# Patient Record
Sex: FEMALE | Race: WHITE | NOT HISPANIC OR LATINO | Employment: OTHER | URBAN - NONMETROPOLITAN AREA
[De-identification: names, ages, dates, MRNs, and addresses within clinical notes are randomized per-mention and may not be internally consistent; named-entity substitution may affect disease eponyms.]

---

## 2019-09-03 ENCOUNTER — OUTSIDE FACILITY SERVICE (OUTPATIENT)
Dept: CARDIOLOGY | Facility: CLINIC | Age: 49
End: 2019-09-03

## 2019-09-03 ENCOUNTER — TRANSCRIBE ORDERS (OUTPATIENT)
Dept: CARDIOLOGY | Facility: HOSPITAL | Age: 49
End: 2019-09-03

## 2019-09-03 DIAGNOSIS — E78.5 DYSLIPIDEMIA: ICD-10-CM

## 2019-09-03 DIAGNOSIS — I20.0 UNSTABLE ANGINA (HCC): ICD-10-CM

## 2019-09-03 DIAGNOSIS — R07.9 CHEST PAIN, UNSPECIFIED TYPE: Primary | ICD-10-CM

## 2019-09-03 PROCEDURE — 99204 OFFICE O/P NEW MOD 45 MIN: CPT | Performed by: INTERNAL MEDICINE

## 2019-09-03 PROCEDURE — 93010 ELECTROCARDIOGRAM REPORT: CPT | Performed by: INTERNAL MEDICINE

## 2019-09-06 ENCOUNTER — TELEPHONE (OUTPATIENT)
Dept: CARDIOLOGY | Facility: HOSPITAL | Age: 49
End: 2019-09-06

## 2019-09-06 DIAGNOSIS — R07.9 CHEST PAIN, UNSPECIFIED TYPE: Primary | ICD-10-CM

## 2019-09-11 ENCOUNTER — APPOINTMENT (OUTPATIENT)
Dept: CARDIOLOGY | Facility: HOSPITAL | Age: 49
End: 2019-09-11

## 2019-09-23 ENCOUNTER — APPOINTMENT (OUTPATIENT)
Dept: CARDIOLOGY | Facility: HOSPITAL | Age: 49
End: 2019-09-23

## 2019-09-30 ENCOUNTER — HOSPITAL ENCOUNTER (OUTPATIENT)
Dept: CARDIOLOGY | Facility: HOSPITAL | Age: 49
Discharge: HOME | End: 2019-09-30
Admitting: INTERNAL MEDICINE

## 2019-09-30 DIAGNOSIS — R07.9 CHEST PAIN, UNSPECIFIED TYPE: ICD-10-CM

## 2019-09-30 LAB
BH CV ECHO MEAS - ACS: 1.6 CM
BH CV ECHO MEAS - AI DEC SLOPE: 202.4 CM/SEC^2
BH CV ECHO MEAS - AI DEC TIME: 1.5 SEC
BH CV ECHO MEAS - AI MAX PG: 37.1 MMHG
BH CV ECHO MEAS - AI MAX VEL: 304.4 CM/SEC
BH CV ECHO MEAS - AI P1/2T: 440.6 MSEC
BH CV ECHO MEAS - AO MAX PG (FULL): 8.1 MMHG
BH CV ECHO MEAS - AO MAX PG: 11.2 MMHG
BH CV ECHO MEAS - AO MEAN PG (FULL): 3.6 MMHG
BH CV ECHO MEAS - AO MEAN PG: 5.2 MMHG
BH CV ECHO MEAS - AO ROOT AREA: 7 CM^2
BH CV ECHO MEAS - AO ROOT DIAM: 3 CM
BH CV ECHO MEAS - AO V2 MAX: 167.5 CM/SEC
BH CV ECHO MEAS - AO V2 MEAN: 106.4 CM/SEC
BH CV ECHO MEAS - AO V2 VTI: 26.3 CM
BH CV ECHO MEAS - ASC AORTA: 2.9 CM
BH CV ECHO MEAS - AVA(I,A): 1.5 CM^2
BH CV ECHO MEAS - AVA(I,D): 1.5 CM^2
BH CV ECHO MEAS - AVA(V,A): 1.4 CM^2
BH CV ECHO MEAS - AVA(V,D): 1.4 CM^2
BH CV ECHO MEAS - EDV(CUBED): 261.7 ML
BH CV ECHO MEAS - EDV(MOD-SP2): 154.5 ML
BH CV ECHO MEAS - EDV(MOD-SP4): 140.7 ML
BH CV ECHO MEAS - EDV(TEICH): 208.2 ML
BH CV ECHO MEAS - EF(CUBED): 23 %
BH CV ECHO MEAS - EF(MOD-SP2): 30.6 %
BH CV ECHO MEAS - EF(MOD-SP4): 29.3 %
BH CV ECHO MEAS - EF(TEICH): 18.1 %
BH CV ECHO MEAS - ESV(CUBED): 201.4 ML
BH CV ECHO MEAS - ESV(MOD-SP2): 107.2 ML
BH CV ECHO MEAS - ESV(MOD-SP4): 99.5 ML
BH CV ECHO MEAS - ESV(TEICH): 170.6 ML
BH CV ECHO MEAS - FS: 8.4 %
BH CV ECHO MEAS - IVS/LVPW: 1.1
BH CV ECHO MEAS - IVSD: 1.1 CM
BH CV ECHO MEAS - LA DIMENSION: 3.4 CM
BH CV ECHO MEAS - LA/AO: 1.2
BH CV ECHO MEAS - LV MASS(C)D: 273.1 GRAMS
BH CV ECHO MEAS - LV MAX PG: 3.1 MMHG
BH CV ECHO MEAS - LV MEAN PG: 1.6 MMHG
BH CV ECHO MEAS - LV V1 MAX: 88.6 CM/SEC
BH CV ECHO MEAS - LV V1 MEAN: 59.6 CM/SEC
BH CV ECHO MEAS - LV V1 VTI: 14.4 CM
BH CV ECHO MEAS - LVIDD: 6.4 CM
BH CV ECHO MEAS - LVIDS: 5.9 CM
BH CV ECHO MEAS - LVOT AREA: 2.7 CM^2
BH CV ECHO MEAS - LVOT DIAM: 1.9 CM
BH CV ECHO MEAS - LVPWD: 0.93 CM
BH CV ECHO MEAS - MV A MAX VEL: 124.1 CM/SEC
BH CV ECHO MEAS - MV DEC SLOPE: 1438 CM/SEC^2
BH CV ECHO MEAS - MV DEC TIME: 0.06 SEC
BH CV ECHO MEAS - MV E MAX VEL: 88.5 CM/SEC
BH CV ECHO MEAS - MV E/A: 0.71
BH CV ECHO MEAS - MV MAX PG: 5.9 MMHG
BH CV ECHO MEAS - MV MEAN PG: 3.2 MMHG
BH CV ECHO MEAS - MV V2 MAX: 121.1 CM/SEC
BH CV ECHO MEAS - MV V2 MEAN: 84.8 CM/SEC
BH CV ECHO MEAS - MV V2 VTI: 15.1 CM
BH CV ECHO MEAS - MVA(VTI): 2.6 CM^2
BH CV ECHO MEAS - PA ACC TIME: 0.1 SEC
BH CV ECHO MEAS - PA MAX PG (FULL): 1.3 MMHG
BH CV ECHO MEAS - PA MAX PG: 2.5 MMHG
BH CV ECHO MEAS - PA PR(ACCEL): 32.3 MMHG
BH CV ECHO MEAS - PA V2 MAX: 79.2 CM/SEC
BH CV ECHO MEAS - RV MAX PG: 1.2 MMHG
BH CV ECHO MEAS - RV MEAN PG: 0.69 MMHG
BH CV ECHO MEAS - RV V1 MAX: 54.9 CM/SEC
BH CV ECHO MEAS - RV V1 MEAN: 39.8 CM/SEC
BH CV ECHO MEAS - RV V1 VTI: 7.5 CM
BH CV ECHO MEAS - RVDD: 2.2 CM
BH CV ECHO MEAS - SV(AO): 183.6 ML
BH CV ECHO MEAS - SV(CUBED): 60.3 ML
BH CV ECHO MEAS - SV(LVOT): 39.1 ML
BH CV ECHO MEAS - SV(MOD-SP2): 47.2 ML
BH CV ECHO MEAS - SV(MOD-SP4): 41.2 ML
BH CV ECHO MEAS - SV(TEICH): 37.6 ML
LV EF 2D ECHO EST: 25 %
MAXIMAL PREDICTED HEART RATE: 169 BPM
STRESS TARGET HR: 144 BPM

## 2019-09-30 PROCEDURE — 93306 TTE W/DOPPLER COMPLETE: CPT

## 2019-09-30 PROCEDURE — 93306 TTE W/DOPPLER COMPLETE: CPT | Performed by: INTERNAL MEDICINE

## 2019-10-15 ENCOUNTER — OUTSIDE FACILITY SERVICE (OUTPATIENT)
Dept: CARDIOLOGY | Facility: CLINIC | Age: 49
End: 2019-10-15

## 2019-10-15 PROCEDURE — 99214 OFFICE O/P EST MOD 30 MIN: CPT | Performed by: INTERNAL MEDICINE

## 2019-10-22 ENCOUNTER — TELEPHONE (OUTPATIENT)
Dept: CARDIOLOGY | Facility: CLINIC | Age: 49
End: 2019-10-22

## 2019-10-22 DIAGNOSIS — I42.0 CARDIOMYOPATHY, DILATED (HCC): Primary | ICD-10-CM

## 2019-10-22 DIAGNOSIS — I34.0 NONRHEUMATIC MITRAL VALVE REGURGITATION: ICD-10-CM

## 2019-10-22 NOTE — TELEPHONE ENCOUNTER
Pt called asking about scheduling cath/ALAINA. If one is needed can you put the order in? Pt requesting to be scheduled next Wed 10/30/2019

## 2019-10-23 PROBLEM — I42.0 CARDIOMYOPATHY, DILATED: Status: ACTIVE | Noted: 2019-10-23

## 2019-10-23 PROBLEM — I34.0 NONRHEUMATIC MITRAL VALVE REGURGITATION: Status: ACTIVE | Noted: 2019-10-23

## 2019-10-29 ENCOUNTER — ANESTHESIA EVENT (OUTPATIENT)
Dept: CARDIOLOGY | Facility: HOSPITAL | Age: 49
End: 2019-10-29

## 2019-10-29 RX ORDER — SODIUM CHLORIDE 9 MG/ML
9 INJECTION, SOLUTION INTRAVENOUS CONTINUOUS PRN
Status: CANCELLED | OUTPATIENT
Start: 2019-10-29

## 2019-10-29 RX ORDER — SODIUM CHLORIDE 0.9 % (FLUSH) 0.9 %
3-10 SYRINGE (ML) INJECTION AS NEEDED
Status: CANCELLED | OUTPATIENT
Start: 2019-10-29

## 2019-10-29 RX ORDER — SODIUM CHLORIDE 0.9 % (FLUSH) 0.9 %
3 SYRINGE (ML) INJECTION EVERY 12 HOURS SCHEDULED
Status: CANCELLED | OUTPATIENT
Start: 2019-10-29

## 2019-10-29 RX ORDER — LIDOCAINE HYDROCHLORIDE 10 MG/ML
0.5 INJECTION, SOLUTION EPIDURAL; INFILTRATION; INTRACAUDAL; PERINEURAL ONCE AS NEEDED
Status: CANCELLED | OUTPATIENT
Start: 2019-10-29

## 2019-10-30 ENCOUNTER — ANESTHESIA (OUTPATIENT)
Dept: CARDIOLOGY | Facility: HOSPITAL | Age: 49
End: 2019-10-30

## 2019-10-30 ENCOUNTER — HOSPITAL ENCOUNTER (OUTPATIENT)
Facility: HOSPITAL | Age: 49
Setting detail: HOSPITAL OUTPATIENT SURGERY
Discharge: HOME | End: 2019-10-30
Attending: INTERNAL MEDICINE | Admitting: INTERNAL MEDICINE

## 2019-10-30 ENCOUNTER — HOSPITAL ENCOUNTER (OUTPATIENT)
Dept: CARDIOLOGY | Facility: HOSPITAL | Age: 49
Discharge: HOME | End: 2019-10-30

## 2019-10-30 ENCOUNTER — LAB (OUTPATIENT)
Dept: LAB | Facility: HOSPITAL | Age: 49
End: 2019-10-30

## 2019-10-30 VITALS
DIASTOLIC BLOOD PRESSURE: 78 MMHG | SYSTOLIC BLOOD PRESSURE: 128 MMHG | RESPIRATION RATE: 15 BRPM | OXYGEN SATURATION: 100 % | HEART RATE: 85 BPM

## 2019-10-30 VITALS
OXYGEN SATURATION: 96 % | HEIGHT: 64 IN | HEART RATE: 98 BPM | BODY MASS INDEX: 29.24 KG/M2 | DIASTOLIC BLOOD PRESSURE: 80 MMHG | SYSTOLIC BLOOD PRESSURE: 131 MMHG | WEIGHT: 171.3 LBS

## 2019-10-30 DIAGNOSIS — I42.0 CARDIOMYOPATHY, DILATED (HCC): ICD-10-CM

## 2019-10-30 DIAGNOSIS — I34.0 NONRHEUMATIC MITRAL VALVE REGURGITATION: ICD-10-CM

## 2019-10-30 LAB
ANION GAP SERPL CALCULATED.3IONS-SCNC: 10 MMOL/L (ref 5–15)
BUN BLD-MCNC: 11 MG/DL (ref 6–20)
BUN/CREAT SERPL: 16.7 (ref 7–25)
CALCIUM SPEC-SCNC: 9.6 MG/DL (ref 8.6–10.5)
CHLORIDE SERPL-SCNC: 105 MMOL/L (ref 98–107)
CO2 SERPL-SCNC: 28 MMOL/L (ref 22–29)
CREAT BLD-MCNC: 0.66 MG/DL (ref 0.57–1)
DEPRECATED RDW RBC AUTO: 45.9 FL (ref 37–54)
ERYTHROCYTE [DISTWIDTH] IN BLOOD BY AUTOMATED COUNT: 14.2 % (ref 12.3–15.4)
GFR SERPL CREATININE-BSD FRML MDRD: 94 ML/MIN/1.73
GLUCOSE BLD-MCNC: 170 MG/DL (ref 65–99)
HCT VFR BLD AUTO: 38.7 % (ref 34–46.6)
HGB BLD-MCNC: 13.6 G/DL (ref 12–15.9)
INR PPP: 1.05 (ref 0.9–1.1)
MCH RBC QN AUTO: 32.5 PG (ref 26.6–33)
MCHC RBC AUTO-ENTMCNC: 35.2 G/DL (ref 31.5–35.7)
MCV RBC AUTO: 92.4 FL (ref 79–97)
PLATELET # BLD AUTO: 315 10*3/MM3 (ref 140–450)
PMV BLD AUTO: 8 FL (ref 6–12)
POTASSIUM BLD-SCNC: 3.9 MMOL/L (ref 3.5–5.2)
PROTHROMBIN TIME: 10.9 SECONDS (ref 9.6–11.7)
RBC # BLD AUTO: 4.19 10*6/MM3 (ref 3.77–5.28)
SODIUM BLD-SCNC: 143 MMOL/L (ref 136–145)
WBC NRBC COR # BLD: 9.2 10*3/MM3 (ref 3.4–10.8)

## 2019-10-30 PROCEDURE — 85027 COMPLETE CBC AUTOMATED: CPT

## 2019-10-30 PROCEDURE — C1769 GUIDE WIRE: HCPCS | Performed by: INTERNAL MEDICINE

## 2019-10-30 PROCEDURE — 93458 L HRT ARTERY/VENTRICLE ANGIO: CPT | Performed by: INTERNAL MEDICINE

## 2019-10-30 PROCEDURE — 25010000002 PROPOFOL 10 MG/ML EMULSION: Performed by: ANESTHESIOLOGY

## 2019-10-30 PROCEDURE — 80048 BASIC METABOLIC PNL TOTAL CA: CPT

## 2019-10-30 PROCEDURE — 36415 COLL VENOUS BLD VENIPUNCTURE: CPT

## 2019-10-30 PROCEDURE — 25010000002 MIDAZOLAM PER 1 MG: Performed by: INTERNAL MEDICINE

## 2019-10-30 PROCEDURE — C1894 INTRO/SHEATH, NON-LASER: HCPCS | Performed by: INTERNAL MEDICINE

## 2019-10-30 PROCEDURE — 0 IOPAMIDOL PER 1 ML: Performed by: INTERNAL MEDICINE

## 2019-10-30 PROCEDURE — 93320 DOPPLER ECHO COMPLETE: CPT

## 2019-10-30 PROCEDURE — 85610 PROTHROMBIN TIME: CPT

## 2019-10-30 PROCEDURE — 93312 ECHO TRANSESOPHAGEAL: CPT

## 2019-10-30 PROCEDURE — 25010000002 FENTANYL CITRATE (PF) 100 MCG/2ML SOLUTION: Performed by: INTERNAL MEDICINE

## 2019-10-30 PROCEDURE — C1760 CLOSURE DEV, VASC: HCPCS | Performed by: INTERNAL MEDICINE

## 2019-10-30 PROCEDURE — 93325 DOPPLER ECHO COLOR FLOW MAPG: CPT

## 2019-10-30 RX ORDER — ACETAMINOPHEN 325 MG/1
650 TABLET ORAL EVERY 4 HOURS PRN
Status: DISCONTINUED | OUTPATIENT
Start: 2019-10-30 | End: 2019-10-30 | Stop reason: HOSPADM

## 2019-10-30 RX ORDER — CYCLOBENZAPRINE HCL 10 MG
10 TABLET ORAL 3 TIMES DAILY PRN
COMMUNITY

## 2019-10-30 RX ORDER — LIDOCAINE HYDROCHLORIDE 10 MG/ML
0.5 INJECTION, SOLUTION EPIDURAL; INFILTRATION; INTRACAUDAL; PERINEURAL ONCE AS NEEDED
Status: DISCONTINUED | OUTPATIENT
Start: 2019-10-30 | End: 2019-10-30 | Stop reason: HOSPADM

## 2019-10-30 RX ORDER — DIPHENHYDRAMINE HCL 25 MG
25 TABLET ORAL EVERY 6 HOURS PRN
Status: DISCONTINUED | OUTPATIENT
Start: 2019-10-30 | End: 2019-10-30 | Stop reason: HOSPADM

## 2019-10-30 RX ORDER — HYDROXYZINE HYDROCHLORIDE 25 MG/1
25 TABLET, FILM COATED ORAL EVERY 8 HOURS PRN
COMMUNITY

## 2019-10-30 RX ORDER — PROPOFOL 10 MG/ML
VIAL (ML) INTRAVENOUS AS NEEDED
Status: DISCONTINUED | OUTPATIENT
Start: 2019-10-30 | End: 2019-10-30 | Stop reason: SURG

## 2019-10-30 RX ORDER — ALUMINA, MAGNESIA, AND SIMETHICONE 2400; 2400; 240 MG/30ML; MG/30ML; MG/30ML
15 SUSPENSION ORAL EVERY 6 HOURS PRN
Status: DISCONTINUED | OUTPATIENT
Start: 2019-10-30 | End: 2019-10-30 | Stop reason: HOSPADM

## 2019-10-30 RX ORDER — IBUPROFEN 600 MG/1
600 TABLET ORAL EVERY 8 HOURS PRN
COMMUNITY
End: 2022-11-15

## 2019-10-30 RX ORDER — LISINOPRIL 10 MG/1
10 TABLET ORAL DAILY
COMMUNITY
End: 2020-11-17

## 2019-10-30 RX ORDER — OMEPRAZOLE 40 MG/1
40 CAPSULE, DELAYED RELEASE ORAL DAILY
COMMUNITY

## 2019-10-30 RX ORDER — RANITIDINE 150 MG/1
150 TABLET ORAL DAILY
COMMUNITY
End: 2021-11-16

## 2019-10-30 RX ORDER — TRAMADOL HYDROCHLORIDE 50 MG/1
50 TABLET ORAL EVERY 8 HOURS PRN
COMMUNITY

## 2019-10-30 RX ORDER — CARVEDILOL 12.5 MG/1
12.5 TABLET ORAL 2 TIMES DAILY WITH MEALS
COMMUNITY

## 2019-10-30 RX ORDER — SODIUM CHLORIDE 9 MG/ML
INJECTION, SOLUTION INTRAVENOUS CONTINUOUS PRN
Status: DISCONTINUED | OUTPATIENT
Start: 2019-10-30 | End: 2019-10-30 | Stop reason: SURG

## 2019-10-30 RX ORDER — MIDAZOLAM HYDROCHLORIDE 1 MG/ML
INJECTION INTRAMUSCULAR; INTRAVENOUS AS NEEDED
Status: DISCONTINUED | OUTPATIENT
Start: 2019-10-30 | End: 2019-10-30 | Stop reason: HOSPADM

## 2019-10-30 RX ORDER — FUROSEMIDE 40 MG/1
20 TABLET ORAL DAILY
COMMUNITY

## 2019-10-30 RX ORDER — FLUTICASONE PROPIONATE 220 UG/1
1 AEROSOL, METERED RESPIRATORY (INHALATION)
COMMUNITY
End: 2020-11-17

## 2019-10-30 RX ORDER — ONDANSETRON 4 MG/1
4 TABLET, FILM COATED ORAL EVERY 6 HOURS PRN
Status: DISCONTINUED | OUTPATIENT
Start: 2019-10-30 | End: 2019-10-30 | Stop reason: HOSPADM

## 2019-10-30 RX ORDER — DESOXIMETASONE 2.5 MG/G
1 CREAM TOPICAL 2 TIMES DAILY
COMMUNITY

## 2019-10-30 RX ORDER — LIDOCAINE HYDROCHLORIDE 20 MG/ML
INJECTION, SOLUTION INFILTRATION; PERINEURAL AS NEEDED
Status: DISCONTINUED | OUTPATIENT
Start: 2019-10-30 | End: 2019-10-30 | Stop reason: HOSPADM

## 2019-10-30 RX ORDER — MOMETASONE FUROATE 50 UG/1
1 SPRAY, METERED NASAL DAILY
COMMUNITY
End: 2021-11-16

## 2019-10-30 RX ORDER — ALBUTEROL SULFATE 2.5 MG/3ML
2.5 SOLUTION RESPIRATORY (INHALATION) EVERY 4 HOURS PRN
COMMUNITY

## 2019-10-30 RX ORDER — FENTANYL CITRATE 50 UG/ML
INJECTION, SOLUTION INTRAMUSCULAR; INTRAVENOUS AS NEEDED
Status: DISCONTINUED | OUTPATIENT
Start: 2019-10-30 | End: 2019-10-30 | Stop reason: HOSPADM

## 2019-10-30 RX ORDER — SODIUM CHLORIDE 0.9 % (FLUSH) 0.9 %
3 SYRINGE (ML) INJECTION EVERY 12 HOURS SCHEDULED
Status: DISCONTINUED | OUTPATIENT
Start: 2019-10-30 | End: 2019-10-30 | Stop reason: HOSPADM

## 2019-10-30 RX ORDER — LORATADINE 10 MG/1
10 TABLET ORAL DAILY
COMMUNITY

## 2019-10-30 RX ORDER — GABAPENTIN 600 MG/1
600 TABLET ORAL 3 TIMES DAILY
COMMUNITY
End: 2022-11-15

## 2019-10-30 RX ORDER — SPIRONOLACTONE 25 MG/1
25 TABLET ORAL DAILY
COMMUNITY

## 2019-10-30 RX ORDER — DULOXETIN HYDROCHLORIDE 60 MG/1
60 CAPSULE, DELAYED RELEASE ORAL DAILY
COMMUNITY
End: 2020-11-17

## 2019-10-30 RX ORDER — ATORVASTATIN CALCIUM 20 MG/1
20 TABLET, FILM COATED ORAL DAILY
COMMUNITY

## 2019-10-30 RX ORDER — SODIUM CHLORIDE 0.9 % (FLUSH) 0.9 %
3-10 SYRINGE (ML) INJECTION AS NEEDED
Status: DISCONTINUED | OUTPATIENT
Start: 2019-10-30 | End: 2019-10-30 | Stop reason: HOSPADM

## 2019-10-30 RX ORDER — PROMETHAZINE HYDROCHLORIDE 25 MG/1
25 TABLET ORAL EVERY 8 HOURS PRN
COMMUNITY

## 2019-10-30 RX ORDER — ONDANSETRON 2 MG/ML
4 INJECTION INTRAMUSCULAR; INTRAVENOUS EVERY 6 HOURS PRN
Status: DISCONTINUED | OUTPATIENT
Start: 2019-10-30 | End: 2019-10-30 | Stop reason: HOSPADM

## 2019-10-30 RX ORDER — ACYCLOVIR 200 MG/1
200 CAPSULE ORAL
COMMUNITY

## 2019-10-30 RX ORDER — AMITRIPTYLINE HYDROCHLORIDE 10 MG/1
10 TABLET, FILM COATED ORAL NIGHTLY PRN
COMMUNITY
End: 2020-11-17

## 2019-10-30 RX ORDER — SODIUM CHLORIDE 9 MG/ML
9 INJECTION, SOLUTION INTRAVENOUS CONTINUOUS PRN
Status: DISCONTINUED | OUTPATIENT
Start: 2019-10-30 | End: 2019-10-30 | Stop reason: HOSPADM

## 2019-10-30 RX ORDER — BUDESONIDE AND FORMOTEROL FUMARATE DIHYDRATE 160; 4.5 UG/1; UG/1
2 AEROSOL RESPIRATORY (INHALATION)
COMMUNITY

## 2019-10-30 RX ADMIN — SODIUM CHLORIDE: 0.9 INJECTION, SOLUTION INTRAVENOUS at 10:26

## 2019-10-30 RX ADMIN — ACETAMINOPHEN 650 MG: 325 TABLET ORAL at 14:07

## 2019-10-30 RX ADMIN — PROPOFOL 400 MG: 10 INJECTION, EMULSION INTRAVENOUS at 11:13

## 2019-10-30 NOTE — ANESTHESIA PREPROCEDURE EVALUATION
Anesthesia Evaluation     Patient summary reviewed and Nursing notes reviewed   NPO Solid Status: > 8 hours  NPO Liquid Status: > 8 hours           Airway   Mallampati: II  TM distance: >3 FB  Neck ROM: full  No difficulty expected  Dental - normal exam     Pulmonary - normal exam   (+) asthma,  Cardiovascular - normal exam    (+) hypertension, valvular problems/murmurs, CAD, hyperlipidemia,       Neuro/Psych  (+) psychiatric history,     GI/Hepatic/Renal/Endo    (+)  GERD,  diabetes mellitus,     Musculoskeletal     Abdominal  - normal exam    Bowel sounds: normal.   Substance History      OB/GYN          Other                        Anesthesia Plan    ASA 3     MAC     intravenous induction   Anesthetic plan, all risks, benefits, and alternatives have been provided, discussed and informed consent has been obtained with: patient.

## 2019-10-30 NOTE — ANESTHESIA POSTPROCEDURE EVALUATION
Patient: Val Lang    Procedure Summary     Date:  10/30/19 Room / Location:  [facility]    Anesthesia Start:  1113 Anesthesia Stop:  1132    Procedure:  ADULT TRANSESOPHAGEAL ECHO (ALAINA) W/ CONT IF NECESSARY PER PROTOCOL Diagnosis:       Cardiomyopathy, dilated (CMS/HCC)      Nonrheumatic mitral valve regurgitation      (Valvular Disease)      (Mitral Valve Assessment)    Scheduled Providers:  Jon De Luna MD Provider:  Noel Stewart MD    Anesthesia Type:  MAC ASA Status:  3          Anesthesia Type: MAC  Last vitals  BP   128/78 (10/30/19 0927)   Temp       Pulse   85 (10/30/19 0927)   Resp   15 (10/30/19 0927)     SpO2   100 % (10/30/19 0927)     Post Anesthesia Care and Evaluation    Patient location during evaluation: PACU  Patient participation: complete - patient participated  Level of consciousness: awake  Pain scale: See nurse's notes for pain score.  Pain management: adequate  Airway patency: patent  Anesthetic complications: No anesthetic complications  PONV Status: none  Cardiovascular status: acceptable  Respiratory status: acceptable  Hydration status: acceptable    Comments: Patient seen and examined postoperatively; vital signs stable; SpO2 greater than or equal to 90%; cardiopulmonary status stable; nausea/vomiting adequately controlled; pain adequately controlled; no apparent anesthesia complications; patient discharged from anesthesia care when discharge criteria were met

## 2019-10-31 LAB — LV EF 2D ECHO EST: 30 %

## 2019-10-31 PROCEDURE — 93320 DOPPLER ECHO COMPLETE: CPT | Performed by: INTERNAL MEDICINE

## 2019-10-31 PROCEDURE — 93312 ECHO TRANSESOPHAGEAL: CPT | Performed by: INTERNAL MEDICINE

## 2019-10-31 PROCEDURE — 93325 DOPPLER ECHO COLOR FLOW MAPG: CPT | Performed by: INTERNAL MEDICINE

## 2019-11-12 ENCOUNTER — OUTSIDE FACILITY SERVICE (OUTPATIENT)
Dept: CARDIOLOGY | Facility: CLINIC | Age: 49
End: 2019-11-12

## 2019-11-12 PROCEDURE — 99213 OFFICE O/P EST LOW 20 MIN: CPT | Performed by: INTERNAL MEDICINE

## 2019-11-19 ENCOUNTER — OUTSIDE FACILITY SERVICE (OUTPATIENT)
Dept: CARDIOLOGY | Facility: CLINIC | Age: 49
End: 2019-11-19

## 2019-11-19 PROCEDURE — 99213 OFFICE O/P EST LOW 20 MIN: CPT | Performed by: INTERNAL MEDICINE

## 2020-01-13 ENCOUNTER — TELEPHONE (OUTPATIENT)
Dept: CARDIOLOGY | Facility: CLINIC | Age: 50
End: 2020-01-13

## 2020-02-24 DIAGNOSIS — I42.0 CARDIOMYOPATHY, DILATED (HCC): Primary | ICD-10-CM

## 2020-02-25 ENCOUNTER — OUTSIDE FACILITY SERVICE (OUTPATIENT)
Dept: CARDIOLOGY | Facility: CLINIC | Age: 50
End: 2020-02-25

## 2020-02-25 DIAGNOSIS — I42.0 DILATED CARDIOMYOPATHY (HCC): Primary | ICD-10-CM

## 2020-02-25 DIAGNOSIS — I34.0 NONRHEUMATIC MITRAL VALVE REGURGITATION: ICD-10-CM

## 2020-02-25 PROCEDURE — 99213 OFFICE O/P EST LOW 20 MIN: CPT | Performed by: INTERNAL MEDICINE

## 2020-03-18 ENCOUNTER — APPOINTMENT (OUTPATIENT)
Dept: CARDIOLOGY | Facility: HOSPITAL | Age: 50
End: 2020-03-18

## 2020-06-01 RX ORDER — SACUBITRIL AND VALSARTAN 24; 26 MG/1; MG/1
TABLET, FILM COATED ORAL
Qty: 30 TABLET | Refills: 3 | Status: SHIPPED | OUTPATIENT
Start: 2020-06-01 | End: 2021-04-14 | Stop reason: SDUPTHER

## 2020-06-30 ENCOUNTER — TELEPHONE (OUTPATIENT)
Dept: CARDIOLOGY | Facility: CLINIC | Age: 50
End: 2020-06-30

## 2020-10-16 RX ORDER — BUDESONIDE AND FORMOTEROL FUMARATE DIHYDRATE 160; 4.5 UG/1; UG/1
AEROSOL RESPIRATORY (INHALATION)
COMMUNITY
Start: 2019-09-03 | End: 2021-11-16 | Stop reason: SDUPTHER

## 2020-10-20 ENCOUNTER — OUTSIDE FACILITY SERVICE (OUTPATIENT)
Dept: CARDIOLOGY | Facility: CLINIC | Age: 50
End: 2020-10-20

## 2020-10-20 PROCEDURE — 99213 OFFICE O/P EST LOW 20 MIN: CPT | Performed by: INTERNAL MEDICINE

## 2020-12-01 ENCOUNTER — TELEPHONE (OUTPATIENT)
Dept: CARDIOLOGY | Facility: CLINIC | Age: 50
End: 2020-12-01

## 2020-12-01 NOTE — TELEPHONE ENCOUNTER
Pt called and wanted to know the dosing she is supposed to be taking of Entresto.  She stated pharmacy gave her both 24/26mg and 90/96 (she couldn't remember exact mg but thought that what it was). I advsd per OV on 10/20/2020 she should be taking 24/26mg. She verbally understood and stated would call pharmacy and let them know to d/c the other dosing.

## 2021-01-08 ENCOUNTER — TELEPHONE (OUTPATIENT)
Dept: CARDIOLOGY | Facility: CLINIC | Age: 51
End: 2021-01-08

## 2021-01-11 ENCOUNTER — TELEPHONE (OUTPATIENT)
Dept: CARDIOLOGY | Facility: CLINIC | Age: 51
End: 2021-01-11

## 2021-01-11 NOTE — TELEPHONE ENCOUNTER
Val from [facility] called stating that the patient came in today for a BMP and she has a critical blood glucose of 521.   Please advise.

## 2021-01-11 NOTE — TELEPHONE ENCOUNTER
Please send the labs to PCP please call the patient advised her to call PCP immediately for further treatment and evaluation  If she have any symptoms of blurry vision dizziness not feeling well she needs to go to the emergency room

## 2021-03-23 ENCOUNTER — TELEPHONE (OUTPATIENT)
Dept: CARDIOLOGY | Facility: CLINIC | Age: 51
End: 2021-03-23

## 2021-03-24 NOTE — TELEPHONE ENCOUNTER
Patient is cleared for surgery, letter has been signed, and has been faxed back to the doctors office.

## 2021-04-14 RX ORDER — SACUBITRIL AND VALSARTAN 24; 26 MG/1; MG/1
1 TABLET, FILM COATED ORAL 2 TIMES DAILY
Qty: 60 TABLET | Refills: 5 | Status: SHIPPED | OUTPATIENT
Start: 2021-04-14

## 2021-08-24 ENCOUNTER — OUTSIDE FACILITY SERVICE (OUTPATIENT)
Dept: CARDIOLOGY | Facility: CLINIC | Age: 51
End: 2021-08-24

## 2021-08-24 PROCEDURE — 99214 OFFICE O/P EST MOD 30 MIN: CPT | Performed by: INTERNAL MEDICINE

## 2021-09-22 ENCOUNTER — TELEPHONE (OUTPATIENT)
Dept: CARDIOLOGY | Facility: CLINIC | Age: 51
End: 2021-09-22

## 2021-09-22 NOTE — TELEPHONE ENCOUNTER
PATIENT WAS SUPPOSED TO HAVE ECHO AND F/U WITH DR. MEMBRENO, BUT SHE WAS SICK AND NOT ABLE. SHE IS AWARE DR. MEMBRENO IS LEAVING OFFICE. ASKING HOW SHE CAN GET ECHO RESCHEDULED AT [facility]? SHE TRIED TO CALL TO [facility], BUT TOLD SHE NEEDED TO CONTACT US. I DID MAKE APT WITH DR. DO 11/9/21.

## 2021-09-23 ENCOUNTER — TELEPHONE (OUTPATIENT)
Dept: CARDIOLOGY | Facility: CLINIC | Age: 51
End: 2021-09-23

## 2021-09-24 NOTE — TELEPHONE ENCOUNTER
I will reach out to Yocasta Mohr and see if she can help with this because it does look like he did say he wanted an echo done soon.

## 2021-09-30 NOTE — TELEPHONE ENCOUNTER
I never heard back from Yocasta. I resent the order to [facility] with a note asking them to call the patient to reschedule the echo. I called the patient to let her know and she said that when she called last time, they couldn't reschedule it because Dev was no longer practicing. I told her to call them again and speak to someone else because I am under the impression that if the doctor was still practicing when they signed the order, then the order should still stand. She is going to call [facility] again this afternoon or tomorrow and see if they will schedule it.

## 2021-11-09 ENCOUNTER — OUTSIDE FACILITY SERVICE (OUTPATIENT)
Dept: CARDIOLOGY | Facility: CLINIC | Age: 51
End: 2021-11-09

## 2021-11-09 PROCEDURE — 99214 OFFICE O/P EST MOD 30 MIN: CPT | Performed by: INTERNAL MEDICINE

## 2021-11-16 RX ORDER — FLUTICASONE PROPIONATE 220 UG/1
1 AEROSOL, METERED RESPIRATORY (INHALATION)
COMMUNITY

## 2021-11-16 RX ORDER — AMMONIUM LACTATE 120 MG/G
1 CREAM TOPICAL AS NEEDED
COMMUNITY
End: 2022-11-15

## 2022-11-02 RX ORDER — EMPAGLIFLOZIN 10 MG/1
TABLET, FILM COATED ORAL
Qty: 90 TABLET | Refills: 0 | Status: SHIPPED | OUTPATIENT
Start: 2022-11-02

## 2022-11-08 ENCOUNTER — OUTSIDE FACILITY SERVICE (OUTPATIENT)
Dept: CARDIOLOGY | Facility: CLINIC | Age: 52
End: 2022-11-08

## 2022-11-08 PROCEDURE — 99214 OFFICE O/P EST MOD 30 MIN: CPT | Performed by: INTERNAL MEDICINE

## 2022-11-15 RX ORDER — SACUBITRIL AND VALSARTAN 97; 103 MG/1; MG/1
1 TABLET, FILM COATED ORAL 2 TIMES DAILY
Qty: 60 TABLET | Refills: 0 | Status: SHIPPED | OUTPATIENT
Start: 2022-11-15

## 2022-11-15 RX ORDER — GABAPENTIN 800 MG/1
800 TABLET ORAL 3 TIMES DAILY
COMMUNITY
Start: 2022-11-02

## 2022-11-15 RX ORDER — INSULIN GLARGINE 100 [IU]/ML
INJECTION, SOLUTION SUBCUTANEOUS
COMMUNITY
Start: 2022-11-02

## 2022-11-15 RX ORDER — FLURBIPROFEN SODIUM 0.3 MG/ML
SOLUTION/ DROPS OPHTHALMIC
COMMUNITY
Start: 2022-11-07

## 2022-11-15 RX ORDER — PEN NEEDLE, DIABETIC 29 G X1/2"
NEEDLE, DISPOSABLE MISCELLANEOUS DAILY
COMMUNITY
Start: 2022-10-05

## 2022-11-15 RX ORDER — MOMETASONE FUROATE 50 UG/1
1 SPRAY, METERED NASAL DAILY
COMMUNITY
Start: 2022-11-02

## 2022-11-15 RX ORDER — IBUPROFEN 800 MG/1
TABLET ORAL
COMMUNITY
Start: 2022-10-13

## 2023-06-16 ENCOUNTER — TELEPHONE (OUTPATIENT)
Dept: CARDIOLOGY | Facility: CLINIC | Age: 53
End: 2023-06-16
Payer: MEDICARE

## 2023-06-16 NOTE — TELEPHONE ENCOUNTER
Caller: Val Lee    Relationship to patient: Self    Best call back number: [phone]      Type of visit: FOLLOW UP    Requested date: ASAP    If rescheduling, when is the original appointment: NOTHING SCHEDULED    Additional notes PATIENT WOULD LIKE TO SEE DR GARCIA AT THE [place] LOCATION NO AVAILABLE DATES INTO 2024.PATIENT STATES SHE IS HAVING OCCASIONAL CHEST PAINS.

## 2023-07-25 ENCOUNTER — HOSPITAL ENCOUNTER (OUTPATIENT)
Dept: NUCLEAR MEDICINE | Facility: HOSPITAL | Age: 53
Discharge: HOME | End: 2023-07-25
Payer: MEDICARE

## 2023-07-25 ENCOUNTER — HOSPITAL ENCOUNTER (OUTPATIENT)
Dept: CARDIOLOGY | Facility: HOSPITAL | Age: 53
Discharge: HOME | End: 2023-07-25
Payer: MEDICARE

## 2023-07-25 DIAGNOSIS — I42.0 CARDIOMYOPATHY, DILATED: ICD-10-CM

## 2023-07-25 DIAGNOSIS — R07.9 CHEST PAIN, UNSPECIFIED TYPE: ICD-10-CM

## 2023-07-25 DIAGNOSIS — I42.0 CARDIOMYOPATHY, DILATED: Primary | ICD-10-CM

## 2023-07-25 DIAGNOSIS — I34.0 NONRHEUMATIC MITRAL VALVE REGURGITATION: ICD-10-CM

## 2023-07-25 DIAGNOSIS — R94.30 ABNORMAL RESULTS OF CARDIOVASCULAR FUNCTION STUDIES: ICD-10-CM

## 2023-07-25 LAB
BH CV ECHO MEAS - ACS: 1.84 CM
BH CV ECHO MEAS - AO MAX PG: 13.4 MMHG
BH CV ECHO MEAS - AO MEAN PG: 7.1 MMHG
BH CV ECHO MEAS - AO ROOT DIAM: 2.48 CM
BH CV ECHO MEAS - AO V2 MAX: 178.4 CM/SEC
BH CV ECHO MEAS - AO V2 VTI: 33.3 CM
BH CV ECHO MEAS - AVA(I,D): 1.66 CM2
BH CV ECHO MEAS - EDV(CUBED): 157.3 ML
BH CV ECHO MEAS - EDV(MOD-SP4): 146.6 ML
BH CV ECHO MEAS - EF(MOD-BP): 55 %
BH CV ECHO MEAS - EF(MOD-SP4): 55.5 %
BH CV ECHO MEAS - ESV(CUBED): 85.4 ML
BH CV ECHO MEAS - ESV(MOD-SP4): 65.3 ML
BH CV ECHO MEAS - FS: 18.4 %
BH CV ECHO MEAS - IVS/LVPW: 0.97 CM
BH CV ECHO MEAS - IVSD: 1.18 CM
BH CV ECHO MEAS - LV DIASTOLIC VOL/BSA (35-75): 80.1 CM2
BH CV ECHO MEAS - LV MASS(C)D: 264.5 GRAMS
BH CV ECHO MEAS - LV MAX PG: 4 MMHG
BH CV ECHO MEAS - LV MEAN PG: 2.05 MMHG
BH CV ECHO MEAS - LV SYSTOLIC VOL/BSA (12-30): 35.7 CM2
BH CV ECHO MEAS - LV V1 MAX: 99.4 CM/SEC
BH CV ECHO MEAS - LV V1 VTI: 17.8 CM
BH CV ECHO MEAS - LVIDD: 5.4 CM
BH CV ECHO MEAS - LVIDS: 4.4 CM
BH CV ECHO MEAS - LVOT AREA: 3.1 CM2
BH CV ECHO MEAS - LVOT DIAM: 1.99 CM
BH CV ECHO MEAS - LVPWD: 1.22 CM
BH CV ECHO MEAS - MR MAX PG: 48 MMHG
BH CV ECHO MEAS - MR MAX VEL: 346.6 CM/SEC
BH CV ECHO MEAS - MV A MAX VEL: 34.9 CM/SEC
BH CV ECHO MEAS - MV DEC SLOPE: 301.6 CM/SEC2
BH CV ECHO MEAS - MV DEC TIME: 0.18 MSEC
BH CV ECHO MEAS - MV E MAX VEL: 53.9 CM/SEC
BH CV ECHO MEAS - MV E/A: 1.54
BH CV ECHO MEAS - MV MAX PG: 5.7 MMHG
BH CV ECHO MEAS - MV MEAN PG: 2.45 MMHG
BH CV ECHO MEAS - MV V2 VTI: 31.9 CM
BH CV ECHO MEAS - MVA(VTI): 1.73 CM2
BH CV ECHO MEAS - PA ACC TIME: 0.11 SEC
BH CV ECHO MEAS - RAP SYSTOLE: 3 MMHG
BH CV ECHO MEAS - RV MAX PG: 3.6 MMHG
BH CV ECHO MEAS - RV V1 MAX: 94.8 CM/SEC
BH CV ECHO MEAS - RV V1 VTI: 16.1 CM
BH CV ECHO MEAS - RVDD: 3.7 CM
BH CV ECHO MEAS - SI(MOD-SP4): 44.4 ML/M2
BH CV ECHO MEAS - SV(LVOT): 55.2 ML
BH CV ECHO MEAS - SV(MOD-SP4): 81.3 ML
BH CV REST NUCLEAR ISOTOPE DOSE: 11 MCI
BH CV STRESS BP STAGE 1: NORMAL
BH CV STRESS BP STAGE 2: NORMAL
BH CV STRESS COMMENTS STAGE 1: NORMAL
BH CV STRESS COMMENTS STAGE 2: NORMAL
BH CV STRESS DOSE REGADENOSON STAGE 1: 0.4
BH CV STRESS DURATION MIN STAGE 1: 0
BH CV STRESS DURATION MIN STAGE 2: 4
BH CV STRESS DURATION SEC STAGE 1: 10
BH CV STRESS DURATION SEC STAGE 2: 0
BH CV STRESS HR STAGE 1: 89
BH CV STRESS HR STAGE 2: 94
BH CV STRESS NUCLEAR ISOTOPE DOSE: 32 MCI
BH CV STRESS PROTOCOL 1: NORMAL
BH CV STRESS RECOVERY BP: NORMAL MMHG
BH CV STRESS RECOVERY HR: 98 BPM
BH CV STRESS STAGE 1: 1
BH CV STRESS STAGE 2: 2
LV EF NUC BP: 35 %
MAXIMAL PREDICTED HEART RATE: 165 BPM
PERCENT MAX PREDICTED HR: 61.82 %
STRESS BASELINE BP: NORMAL MMHG
STRESS BASELINE HR: 89 BPM
STRESS PERCENT HR: 73 %
STRESS POST PEAK BP: NORMAL MMHG
STRESS POST PEAK HR: 102 BPM
STRESS TARGET HR: 140 BPM

## 2023-07-25 PROCEDURE — 93017 CV STRESS TEST TRACING ONLY: CPT

## 2023-07-25 PROCEDURE — 0 TECHNETIUM TETROFOSMIN KIT: Performed by: INTERNAL MEDICINE

## 2023-07-25 PROCEDURE — 93018 CV STRESS TEST I&R ONLY: CPT | Performed by: INTERNAL MEDICINE

## 2023-07-25 PROCEDURE — 93016 CV STRESS TEST SUPVJ ONLY: CPT | Performed by: NURSE PRACTITIONER

## 2023-07-25 PROCEDURE — A9502 TC99M TETROFOSMIN: HCPCS | Performed by: INTERNAL MEDICINE

## 2023-07-25 PROCEDURE — 78452 HT MUSCLE IMAGE SPECT MULT: CPT | Performed by: INTERNAL MEDICINE

## 2023-07-25 PROCEDURE — 25010000002 REGADENOSON 0.4 MG/5ML SOLUTION: Performed by: INTERNAL MEDICINE

## 2023-07-25 PROCEDURE — 93306 TTE W/DOPPLER COMPLETE: CPT | Performed by: INTERNAL MEDICINE

## 2023-07-25 PROCEDURE — 93306 TTE W/DOPPLER COMPLETE: CPT

## 2023-07-25 PROCEDURE — 78452 HT MUSCLE IMAGE SPECT MULT: CPT

## 2023-07-25 RX ORDER — REGADENOSON 0.08 MG/ML
0.4 INJECTION, SOLUTION INTRAVENOUS
Status: COMPLETED | OUTPATIENT
Start: 2023-07-25 | End: 2023-07-25

## 2023-07-25 RX ADMIN — REGADENOSON 0.4 MG: 0.08 INJECTION, SOLUTION INTRAVENOUS at 12:07

## 2023-07-25 RX ADMIN — TETROFOSMIN 1 DOSE: 1.38 INJECTION, POWDER, LYOPHILIZED, FOR SOLUTION INTRAVENOUS at 12:07

## 2023-07-25 RX ADMIN — TETROFOSMIN 1 DOSE: 1.38 INJECTION, POWDER, LYOPHILIZED, FOR SOLUTION INTRAVENOUS at 10:54

## 2023-07-27 PROBLEM — R07.9 CHEST PAIN: Status: ACTIVE | Noted: 2023-07-27

## 2023-07-27 PROBLEM — R94.30 ABNORMAL RESULTS OF CARDIOVASCULAR FUNCTION STUDIES: Status: ACTIVE | Noted: 2023-07-27

## 2023-08-04 ENCOUNTER — HOSPITAL ENCOUNTER (OUTPATIENT)
Dept: GENERAL RADIOLOGY | Facility: HOSPITAL | Age: 53
Discharge: HOME | End: 2023-08-04
Payer: MEDICARE

## 2023-08-04 ENCOUNTER — HOSPITAL ENCOUNTER (OUTPATIENT)
Facility: HOSPITAL | Age: 53
Setting detail: HOSPITAL OUTPATIENT SURGERY
Discharge: HOME | End: 2023-08-04
Attending: INTERNAL MEDICINE | Admitting: INTERNAL MEDICINE
Payer: MEDICARE

## 2023-08-04 VITALS
SYSTOLIC BLOOD PRESSURE: 134 MMHG | RESPIRATION RATE: 15 BRPM | DIASTOLIC BLOOD PRESSURE: 81 MMHG | BODY MASS INDEX: 31.5 KG/M2 | HEIGHT: 64 IN | WEIGHT: 184.53 LBS | HEART RATE: 78 BPM | OXYGEN SATURATION: 96 % | TEMPERATURE: 98.1 F

## 2023-08-04 DIAGNOSIS — I42.0 CARDIOMYOPATHY, DILATED: ICD-10-CM

## 2023-08-04 DIAGNOSIS — R94.30 ABNORMAL RESULTS OF CARDIOVASCULAR FUNCTION STUDIES: ICD-10-CM

## 2023-08-04 DIAGNOSIS — R07.9 CHEST PAIN, UNSPECIFIED TYPE: ICD-10-CM

## 2023-08-04 LAB
GLUCOSE BLDC GLUCOMTR-MCNC: 155 MG/DL (ref 70–105)
GLUCOSE BLDC GLUCOMTR-MCNC: 223 MG/DL (ref 70–105)

## 2023-08-04 PROCEDURE — 25510000001 IOPAMIDOL PER 1 ML: Performed by: INTERNAL MEDICINE

## 2023-08-04 PROCEDURE — 99153 MOD SED SAME PHYS/QHP EA: CPT | Performed by: INTERNAL MEDICINE

## 2023-08-04 PROCEDURE — S0260 H&P FOR SURGERY: HCPCS | Performed by: INTERNAL MEDICINE

## 2023-08-04 PROCEDURE — 25010000002 MIDAZOLAM PER 1 MG: Performed by: INTERNAL MEDICINE

## 2023-08-04 PROCEDURE — C1894 INTRO/SHEATH, NON-LASER: HCPCS | Performed by: INTERNAL MEDICINE

## 2023-08-04 PROCEDURE — 25010000002 FENTANYL CITRATE (PF) 100 MCG/2ML SOLUTION: Performed by: INTERNAL MEDICINE

## 2023-08-04 PROCEDURE — 93458 L HRT ARTERY/VENTRICLE ANGIO: CPT | Performed by: INTERNAL MEDICINE

## 2023-08-04 PROCEDURE — 71046 X-RAY EXAM CHEST 2 VIEWS: CPT

## 2023-08-04 PROCEDURE — C1769 GUIDE WIRE: HCPCS | Performed by: INTERNAL MEDICINE

## 2023-08-04 PROCEDURE — 99152 MOD SED SAME PHYS/QHP 5/>YRS: CPT | Performed by: INTERNAL MEDICINE

## 2023-08-04 PROCEDURE — 82948 REAGENT STRIP/BLOOD GLUCOSE: CPT

## 2023-08-04 RX ORDER — ONDANSETRON 4 MG/1
4 TABLET, ORALLY DISINTEGRATING ORAL EVERY 8 HOURS PRN
COMMUNITY

## 2023-08-04 RX ORDER — SODIUM CHLORIDE 9 MG/ML
250 INJECTION, SOLUTION INTRAVENOUS ONCE AS NEEDED
Status: DISCONTINUED | OUTPATIENT
Start: 2023-08-04 | End: 2023-08-04 | Stop reason: HOSPADM

## 2023-08-04 RX ORDER — ONDANSETRON 4 MG/1
4 TABLET, FILM COATED ORAL EVERY 6 HOURS PRN
Status: DISCONTINUED | OUTPATIENT
Start: 2023-08-04 | End: 2023-08-04 | Stop reason: HOSPADM

## 2023-08-04 RX ORDER — LIDOCAINE HYDROCHLORIDE 20 MG/ML
INJECTION, SOLUTION INFILTRATION; PERINEURAL
Status: DISCONTINUED | OUTPATIENT
Start: 2023-08-04 | End: 2023-08-04 | Stop reason: HOSPADM

## 2023-08-04 RX ORDER — FENTANYL CITRATE 50 UG/ML
INJECTION, SOLUTION INTRAMUSCULAR; INTRAVENOUS
Status: DISCONTINUED | OUTPATIENT
Start: 2023-08-04 | End: 2023-08-04 | Stop reason: HOSPADM

## 2023-08-04 RX ORDER — SODIUM CHLORIDE 9 MG/ML
30 INJECTION, SOLUTION INTRAVENOUS CONTINUOUS
Status: DISCONTINUED | OUTPATIENT
Start: 2023-08-04 | End: 2023-08-04 | Stop reason: HOSPADM

## 2023-08-04 RX ORDER — ONDANSETRON 2 MG/ML
4 INJECTION INTRAMUSCULAR; INTRAVENOUS EVERY 6 HOURS PRN
Status: DISCONTINUED | OUTPATIENT
Start: 2023-08-04 | End: 2023-08-04 | Stop reason: HOSPADM

## 2023-08-04 RX ORDER — MIDAZOLAM HYDROCHLORIDE 1 MG/ML
INJECTION INTRAMUSCULAR; INTRAVENOUS
Status: DISCONTINUED | OUTPATIENT
Start: 2023-08-04 | End: 2023-08-04 | Stop reason: HOSPADM

## 2023-08-04 RX ORDER — NITROGLYCERIN 0.4 MG/1
0.4 TABLET SUBLINGUAL
Status: DISCONTINUED | OUTPATIENT
Start: 2023-08-04 | End: 2023-08-04 | Stop reason: HOSPADM

## 2023-08-04 RX ORDER — ACETAMINOPHEN 325 MG/1
650 TABLET ORAL EVERY 4 HOURS PRN
Status: DISCONTINUED | OUTPATIENT
Start: 2023-08-04 | End: 2023-08-04 | Stop reason: HOSPADM

## 2023-08-04 RX ADMIN — SODIUM CHLORIDE 30 ML/HR: 9 INJECTION, SOLUTION INTRAVENOUS at 08:26

## 2023-08-04 NOTE — DISCHARGE INSTRUCTIONS
Post Cath Instructions      Drink plenty of fluids for the next 24 hours.  This helps to eliminate the dye used in your procedure through urination.  You may resume a normal diet; however, try to avoid foods that would cause gas or constipation.    Sedative medication given to you during your catheterization may decrease your judgement and reaction time for up to 24-48 hours.  Therefore:  DO NOT drive or operate hazardous machinery (48 hours)  DO NOT consume alcoholic beverages  DO NOT make any important/legal decisions  Have someone stay with you for at least 24 hours    To allow proper healing and prevent bleeding, the following activities are to be strictly avoided for the next 24-48 hours:  Excessive bending at wound site  Straining (anything that would tense up muscles around the affected puncture site)  Lifting objects greater than 5 pounds, pushing, or pulling for 5 days  For Arm Cases:  No flexing at the puncture site, such as hammering, golfing, bowling, or swinging any objects  For Groin Cases:  Refrain from sexual activity  Refrain from running or vigorous walking  No prolonged sitting or standing  Limit stair climbing as much as possible    Keep the puncture site clean and dry.  You may remove the dressing tomorrow and replace it with a band-aid for at least one additional day.  Gently clean the site with mild soap and water.  No scrubbing/rubbing and lightly pat the area dry.  Showers are acceptable; however, avoid submerging in water (tub baths, hot tubs, swimming pools, dishwater, etc.) for at least one week.  The site should be completely healed before resuming these activities to reduce the risk of infection.  Check the site often.  Watch for signs and symptoms of infection and notify your physician if any of the following occur:  Bleeding or an increase in swelling at the puncture site  Fever  Increased soreness around puncture site  Foul odor or significant drainage from the puncture  "site  Swelling, redness, or warmth at the puncture site    **A bruise or small "pea sized" lump under the skin at the puncture site is not unusual.  This should disappear within 3-4 weeks.**  CONTACT YOUR PHYSICIAN OR CALL 911 IF YOU EXPERIENCE ANY OF THE FOLLOWING:  Increased angina (chest pain) or frequent sensations of pressure, burning, pain, or other discomfort in the chest, arm, jaws, or stomach  Lightheadedness, dizziness, faint feeling, sweating, or difficulty breathing  Odd sensation changes like numbness, tingling, coldness, or pain in the arm or leg in which the catheter was inserted  Limb in which the catheter was inserted becomes pale/bluish in color    IMPORTANT:  Although this occurs very rarely, if you should develop bright red or excessive bleeding, feel a "pop" inside at the insertion site, or notice a sudden increase in swelling larger than a walnut, you should call 911.  Hold continuous firm pressure to the access site until emergency personnel arrive.  It is best if someone else can do this for you.    "

## 2023-08-04 NOTE — Clinical Note
A 6 fr sheath was  inserted using micropuncture technique with ultrasound guidance into the left femoral artery.

## 2023-08-04 NOTE — H&P
[facility] CARDIOLOGY    Jose, Ann-Marie IBRAHIM, RAMON    CHIEF COMPLAINT:     Shortness of breath  Cardiomyopathy    HISTORY OF PRESENT ILLNESS:    49-year-old female with past medical history that is significant history of nonischemic cardiomyopathy history of hypertension hyperlipidemia history of diabetes prior cardiac catheterization 2019 with nonobstructive coronary artery disease with cardiomyopathy and low LV ejection fraction      Now complaining of symptoms of chest pain patient continues to have symptoms of chest discomfort in spite of medical therapy patient had abnormal stress test with low LV ejection fraction plan at this time is to proceed with cardiac catheterization for definite diagnosis and treatment options      Past Medical History:   Diagnosis Date    Acid reflux     Asthma     Coronary artery disease     Depression     Diabetes mellitus     Herpes genitalia     Hyperlipidemia     Hypertension      Past Surgical History:   Procedure Laterality Date    CARDIAC CATHETERIZATION N/A 10/30/2019    Procedure: Left Heart Cath;  Surgeon: Jon De Luna MD;  Location:  [facility];  Service: Cardiovascular    CARDIAC CATHETERIZATION N/A 10/30/2019    Procedure: Coronary angiography;  Surgeon: Jon De Luna MD;  Location:  [facility];  Service: Cardiovascular     No family history on file.  Social History     Tobacco Use    Smoking status: Every Day     Medications Prior to Admission   Medication Sig Dispense Refill Last Dose    acyclovir (ZOVIRAX) 200 MG capsule Take 200 mg by mouth 5 (Five) Times a Day.       acyclovir (ZOVIRAX) 400 MG tablet Take 1 tablet by mouth Every 12 (Twelve) Hours.       albuterol (PROVENTIL) (2.5 MG/3ML) 0.083% nebulizer solution Take 2.5 mg by nebulization Every 4 (Four) Hours As Needed for Wheezing.       Albuterol Sulfate (VENTOLIN HFA IN) Inhale 2 puffs 4 (Four) Times a Day As Needed.       aspirin  81 MG EC tablet Take 81 mg by mouth Daily.       atorvastatin (LIPITOR) 20 MG tablet Take 20 mg by mouth Daily.       B-D UF III MINI PEN NEEDLES 31G X 5 MM misc        budesonide-formoterol (SYMBICORT) 160-4.5 MCG/ACT inhaler Inhale 2 puffs 2 (Two) Times a Day.       carvedilol (COREG) 12.5 MG tablet Take 12.5 mg by mouth 2 (Two) Times a Day With Meals.       Cholecalciferol (Vitamin D3) 50 MCG (2000 UT) tablet Take 1 tablet by mouth Daily.       cyclobenzaprine (FLEXERIL) 10 MG tablet Take 10 mg by mouth 3 (Three) Times a Day As Needed for Muscle Spasms.       desoximetasone (TOPICORT) 0.25 % cream Apply 1 application topically to the appropriate area as directed 2 (Two) Times a Day.       fluticasone (FLOVENT HFA) 220 MCG/ACT inhaler Inhale 1 puff 2 (Two) Times a Day.       furosemide (LASIX) 40 MG tablet Take 20 mg by mouth Daily.       gabapentin (NEURONTIN) 800 MG tablet Take 1 tablet by mouth 3 (Three) Times a Day.       hydrOXYzine (ATARAX) 25 MG tablet Take 25 mg by mouth Every 8 (Eight) Hours As Needed for Itching.       ibuprofen (ADVIL,MOTRIN) 800 MG tablet PLEASE SEE ATTACHED FOR DETAILED DIRECTIONS       Jardiance 10 MG tablet tablet TAKE 1 TABLET BY MOUTH EVERY DAY 90 tablet 0     Lantus SoloStar 100 UNIT/ML injection pen INJECT 62 UNIT UNDER SKIN AT BEDTIME       Liraglutide (VICTOZA) 18 MG/3ML solution pen-injector injection Inject 1.8 mg under the skin into the appropriate area as directed Daily.       loratadine (CLARITIN) 10 MG tablet Take 10 mg by mouth Daily.       metFORMIN (GLUCOPHAGE) 500 MG tablet Take 1,000 mg by mouth 2 (Two) Times a Day With Meals.       metFORMIN ER (GLUCOPHAGE-XR) 500 MG 24 hr tablet TAKE 4 TABS BY MOUTH EVERY DAY       mometasone (NASONEX) 50 MCG/ACT nasal spray 1 spray Daily.       nystatin (MYCOSTATIN) 100,000 unit/mL suspension TAKE 1 TEASPOONFUL (5 ML) BY MOUTH FOUR TIMES A DAY SWISH AND SWALLOW       omeprazole (priLOSEC) 40 MG capsule Take 40 mg by mouth Daily.        promethazine (PHENERGAN) 25 MG tablet Take 25 mg by mouth Every 8 (Eight) Hours As Needed for Nausea or Vomiting.       sacubitril-valsartan (Entresto) 24-26 MG tablet Take 1 tablet by mouth 2 (Two) Times a Day. 60 tablet 5     sacubitril-valsartan (Entresto)  MG tablet Take 1 tablet by mouth 2 (Two) Times a Day. 60 tablet 0     sertraline (ZOLOFT) 50 MG tablet Take 50 mg by mouth Daily.       spironolactone (ALDACTONE) 25 MG tablet Take 25 mg by mouth Daily. Take 1/2 to 1 tablet daily       traMADol (ULTRAM) 50 MG tablet Take 50 mg by mouth Every 8 (Eight) Hours As Needed for Moderate Pain .       UltiCare Mini Pen Needles 31G X 6 MM misc Daily.        Allergies:  Latex, natural rubber      There is no immunization history on file for this patient.        REVIEW OF SYSTEMS:   Review of Systems   Constitutional: Negative for chills, decreased appetite and malaise/fatigue.   HENT:  Negative for congestion and nosebleeds.    Eyes:  Negative for blurred vision and double vision.   Cardiovascular:  Positive for chest pain and dyspnea on exertion.   Respiratory:  Positive for shortness of breath. Negative for cough.    Hematologic/Lymphatic: Negative for adenopathy. Does not bruise/bleed easily.   Skin:  Negative for color change and rash.   Musculoskeletal:  Negative for back pain and joint pain.   Gastrointestinal:  Negative for bloating, abdominal pain, hematemesis and hematochezia.   Genitourinary:  Negative for dysuria and hematuria.   Neurological:  Negative for dizziness and focal weakness.   Psychiatric/Behavioral:  Negative for altered mental status. The patient does not have insomnia.      Medication Review:  Scheduled Meds:  Continuous Infusions:No current facility-administered medications for this encounter.    PRN Meds:.    Vital Signs  There were no vitals taken for this visit.         Physical Exam:  Constitutional:       Appearance: Well-developed.   Eyes:      Conjunctiva/sclera: Conjunctivae  normal.      Pupils: Pupils are equal, round, and reactive to light.   HENT:      Head: Normocephalic and atraumatic.   Neck:      Thyroid: No thyromegaly.   Pulmonary:      Effort: Pulmonary effort is normal.      Breath sounds: Normal breath sounds.   Cardiovascular:      Abnormal PMI. Normal rate. Regular rhythm.   Pulses:     Intact distal pulses.   Edema:     Peripheral edema absent.   Abdominal:      General: Bowel sounds are normal.      Palpations: Abdomen is soft.   Musculoskeletal:      Cervical back: Normal range of motion and neck supple. Skin:     General: Skin is warm.   Neurological:      Mental Status: Alert and oriented to person, place, and time.       Results Review:    I reviewed the patient's new clinical results.  Lab Results (most recent)       None            Imaging Results (Most Recent)       None          reviewed    ECG/EMG Results (most recent)       None          reviewed      Assessment & Plan     Patient Active Problem List   Diagnosis    Cardiomyopathy, dilated    Nonrheumatic mitral valve regurgitation    Chest pain    Abnormal results of cardiovascular function studies           Mannie Hatch MD  08/04/23  08:02 EDT

## 2023-08-04 NOTE — Clinical Note
Prepped: left groin. Prepped with: ChloraPrep. The site was clipped. The patient was draped in a sterile fashion.

## 2023-08-04 NOTE — DISCHARGE INSTR - LAB
Post Cath Instructions         Call Dr. Hatch's office to schedule a follow up appointment in 2 weeks at [phone].      Drink plenty of fluids for the next 24 hours.  This helps to eliminate the dye used in your procedure through urination.  You may resume a normal diet; however, try to avoid foods that would cause gas or constipation.    Sedative medication given to you during your catheterization may decrease your judgement and reaction time for up to 24-48 hours.  Therefore:  DO NOT drive or operate hazardous machinery (48 hours)  DO NOT consume alcoholic beverages  DO NOT make any important/legal decisions  Have someone stay with you for at least 24 hours    To allow proper healing and prevent bleeding, the following activities are to be strictly avoided for the next 24-48 hours:  Excessive bending at wound site  Straining (anything that would tense up muscles around the affected puncture site)  Lifting objects greater than 5 pounds, pushing, or pulling for 5 days      For Groin Cases:  Refrain from sexual activity  Refrain from running or vigorous walking  No prolonged sitting or standing  Limit stair climbing as much as possible    Keep the puncture site clean and dry.  You may remove the dressing tomorrow and replace it with a band-aid for at least one additional day.  Gently clean the site with mild soap and water.  No scrubbing/rubbing and lightly pat the area dry.  Showers are acceptable; however, avoid submerging in water (tub baths, hot tubs, swimming pools, dishwater, etc.) for at least one week.  The site should be completely healed before resuming these activities to reduce the risk of infection.  Check the site often.  Watch for signs and symptoms of infection and notify your physician if any of the following occur:  Bleeding or an increase in swelling at the puncture site  Fever  Increased soreness around puncture site  Foul odor or significant drainage from the puncture site  Swelling,  "redness, or warmth at the puncture site    **A bruise or small "pea sized" lump under the skin at the puncture site is not unusual.  This should disappear within 3-4 weeks.**  CONTACT YOUR PHYSICIAN OR CALL 911 IF YOU EXPERIENCE ANY OF THE FOLLOWING:  Increased angina (chest pain) or frequent sensations of pressure, burning, pain, or other discomfort in the chest, arm, jaws, or stomach  Lightheadedness, dizziness, faint feeling, sweating, or difficulty breathing  Odd sensation changes like numbness, tingling, coldness, or pain in the arm or leg in which the catheter was inserted  Limb in which the catheter was inserted becomes pale/bluish in color    IMPORTANT:  Although this occurs very rarely, if you should develop bright red or excessive bleeding, feel a "pop" inside at the insertion site, or notice a sudden increase in swelling larger than a walnut, you should call 911.  Hold continuous firm pressure to the access site until emergency personnel arrive.  It is best if someone else can do this for you.    "

## 2023-12-05 ENCOUNTER — OUTSIDE FACILITY SERVICE (OUTPATIENT)
Dept: CARDIOLOGY | Facility: CLINIC | Age: 53
End: 2023-12-05
Payer: MEDICARE

## 2025-01-30 RX ORDER — LANOLIN ALCOHOL/MO/W.PET/CERES
1000 CREAM (GRAM) TOPICAL DAILY
COMMUNITY

## 2025-01-30 RX ORDER — CALCITRIOL 0.5 UG/1
0.5 CAPSULE, LIQUID FILLED ORAL DAILY
COMMUNITY

## 2025-01-30 RX ORDER — TIRZEPATIDE 5 MG/.5ML
5 INJECTION, SOLUTION SUBCUTANEOUS WEEKLY
COMMUNITY

## 2025-01-30 RX ORDER — SACUBITRIL AND VALSARTAN 97; 103 MG/1; MG/1
1 TABLET, FILM COATED ORAL 2 TIMES DAILY
COMMUNITY

## 2025-02-25 RX ORDER — EMPAGLIFLOZIN 10 MG/1
25 TABLET, FILM COATED ORAL DAILY
Qty: 90 TABLET | Refills: 1 | Status: SHIPPED | OUTPATIENT
Start: 2025-02-25 | End: 2025-02-27 | Stop reason: CLARIF

## 2025-02-27 RX ORDER — DAPAGLIFLOZIN 10 MG/1
10 TABLET, FILM COATED ORAL DAILY
Qty: 30 TABLET | Refills: 3 | Status: SHIPPED | OUTPATIENT
Start: 2025-02-27

## (undated) DEVICE — GW PTFE EMERALD HEPCOAT FC J TIP STD .035 3MM 150CM

## (undated) DEVICE — ST ACC MICROPUNCTURE STFF/CANN PLAT/TP 4F 21G 40CM

## (undated) DEVICE — CATH DIAG IMPULSE FL4 6F 100CM

## (undated) DEVICE — PINNACLE INTRODUCER SHEATH: Brand: PINNACLE

## (undated) DEVICE — CVR PROB ULTRASND CIVFLEX GEN/PURP TELESCP/FOLD 5.5X24IN LF

## (undated) DEVICE — CATH DIAG IMPULSE FR4 6F 100CM

## (undated) DEVICE — PK TRY HEART CATH 50

## (undated) DEVICE — CATH F5 INF 3DRC 100CM: Brand: INFINITI

## (undated) DEVICE — CATH DIAG IMPULSE PIG .056 6F 110CM

## (undated) DEVICE — ELECTRD DEFIB M/FUNC PROPADZ RADIOL 2PK

## (undated) DEVICE — ANGIO-SEAL VIP VASCULAR CLOSURE DEVICE: Brand: ANGIO-SEAL